# Patient Record
Sex: MALE | Race: WHITE | Employment: FULL TIME | URBAN - METROPOLITAN AREA
[De-identification: names, ages, dates, MRNs, and addresses within clinical notes are randomized per-mention and may not be internally consistent; named-entity substitution may affect disease eponyms.]

---

## 2018-01-23 ENCOUNTER — CLINICAL SUPPORT (OUTPATIENT)
Dept: PRIMARY CARE CLINIC | Age: 53
End: 2018-01-23

## 2018-01-23 DIAGNOSIS — Z23 ENCOUNTER FOR IMMUNIZATION: Primary | ICD-10-CM

## 2018-01-24 NOTE — PATIENT INSTRUCTIONS
Vaccine Information Statement    Influenza (Flu) Vaccine (Inactivated or Recombinant): What you need to know    Many Vaccine Information Statements are available in Hebrew and other languages. See www.immunize.org/vis  Hojas de Información Sobre Vacunas están disponibles en Español y en muchos otros idiomas. Visite www.immunize.org/vis    1. Why get vaccinated? Influenza (flu) is a contagious disease that spreads around the [place] every year, usually between October and May. Flu is caused by influenza viruses, and is spread mainly by coughing, sneezing, and close contact. Anyone can get flu. Flu strikes suddenly and can last several days. Symptoms vary by age, but can include:   fever/chills   sore throat   muscle aches   fatigue   cough   headache    runny or stuffy nose    Flu can also lead to pneumonia and blood infections, and cause diarrhea and seizures in children. If you have a medical condition, such as heart or lung disease, flu can make it worse. Flu is more dangerous for some people. Infants and young children, people 72years of age and older, pregnant women, and people with certain health conditions or a weakened immune system are at greatest risk. Each year thousands of people in the [facility] die from flu, and many more are hospitalized. Flu vaccine can:   keep you from getting flu,   make flu less severe if you do get it, and   keep you from spreading flu to your family and other people. 2. Inactivated and recombinant flu vaccines    A dose of flu vaccine is recommended every flu season. Children 6 months through 6years of age may need two doses during the same flu season. Everyone else needs only one dose each flu season.        Some inactivated flu vaccines contain a very small amount of a mercury-based preservative called thimerosal. Studies have not shown thimerosal in vaccines to be harmful, but flu vaccines that do not contain thimerosal are available. There is no live flu virus in flu shots. They cannot cause the flu. There are many flu viruses, and they are always changing. Each year a new flu vaccine is made to protect against three or four viruses that are likely to cause disease in the upcoming flu season. But even when the vaccine doesnt exactly match these viruses, it may still provide some protection    Flu vaccine cannot prevent:   flu that is caused by a virus not covered by the vaccine, or   illnesses that look like flu but are not. It takes about 2 weeks for protection to develop after vaccination, and protection lasts through the flu season. 3. Some people should not get this vaccine    Tell the person who is giving you the vaccine:     If you have any severe, life-threatening allergies. If you ever had a life-threatening allergic reaction after a dose of flu vaccine, or have a severe allergy to any part of this vaccine, you may be advised not to get vaccinated. Most, but not all, types of flu vaccine contain a small amount of egg protein.  If you ever had Guillain-Barré Syndrome (also called GBS). Some people with a history of GBS should not get this vaccine. This should be discussed with your doctor.  If you are not feeling well. It is usually okay to get flu vaccine when you have a mild illness, but you might be asked to come back when you feel better. 4. Risks of a vaccine reaction    With any medicine, including vaccines, there is a chance of reactions. These are usually mild and go away on their own, but serious reactions are also possible. Most people who get a flu shot do not have any problems with it.      Minor problems following a flu shot include:    soreness, redness, or swelling where the shot was given     hoarseness   sore, red or itchy eyes   cough   fever   aches   headache   itching   fatigue  If these problems occur, they usually begin soon after the shot and last 1 or 2 days. More serious problems following a flu shot can include the following:     There may be a small increased risk of Guillain-Barré Syndrome (GBS) after inactivated flu vaccine. This risk has been estimated at 1 or 2 additional cases per million people vaccinated. This is much lower than the risk of severe complications from flu, which can be prevented by flu vaccine.  Young children who get the flu shot along with pneumococcal vaccine (PCV13) and/or DTaP vaccine at the same time might be slightly more likely to have a seizure caused by fever. Ask your doctor for more information. Tell your doctor if a child who is getting flu vaccine has ever had a seizure. Problems that could happen after any injected vaccine:      People sometimes faint after a medical procedure, including vaccination. Sitting or lying down for about 15 minutes can help prevent fainting, and injuries caused by a fall. Tell your doctor if you feel dizzy, or have vision changes or ringing in the ears.  Some people get severe pain in the shoulder and have difficulty moving the arm where a shot was given. This happens very rarely.  Any medication can cause a severe allergic reaction. Such reactions from a vaccine are very rare, estimated at about 1 in a million doses, and would happen within a few minutes to a few hours after the vaccination. As with any medicine, there is a very remote chance of a vaccine causing a serious injury or death. The safety of vaccines is always being monitored. For more information, visit: www.cdc.gov/vaccinesafety/    5. What if there is a serious reaction? What should I look for?  Look for anything that concerns you, such as signs of a severe allergic reaction, very high fever, or unusual behavior.     Signs of a severe allergic reaction can include hives, swelling of the face and throat, difficulty breathing, a fast heartbeat, dizziness, and weakness - usually within a few minutes to a few hours after the vaccination. What should I do?  If you think it is a severe allergic reaction or other emergency that cant wait, call 9-1-1 and get the person to the nearest hospital. Otherwise, call your doctor.  Reactions should be reported to the Vaccine Adverse Event Reporting System (VAERS). Your doctor should file this report, or you can do it yourself through  the VAERS web site at www.vaers. [facility].gov, or by calling 1-832.833.4299. VAERS does not give medical advice. 6. The National Vaccine Injury Compensation Program    The [facility] Vaccine Injury Compensation Program (VICP) is a federal program that was created to compensate people who may have been injured by certain vaccines. Persons who believe they may have been injured by a vaccine can learn about the program and about filing a claim by calling 0-433.480.8202 or visiting the [employer] website at www.[facility].gov/vaccinecompensation. There is a time limit to file a claim for compensation. 7. How can I learn more?  Ask your healthcare provider. He or she can give you the vaccine package insert or suggest other sources of information.  Call your local or state health department.  Contact the Centers for Disease Control and Prevention (CDC):  - Call 9-310.557.4182 (1-800-CDC-INFO) or  - Visit CDCs website at www.cdc.gov/flu    Vaccine Information Statement   Inactivated Influenza Vaccine   8/7/2015  43 MEENU Ania Sloan [id number]    Department of Health and Human Services  Centers for Disease Control and Prevention    Office Use Only

## 2018-01-24 NOTE — PROGRESS NOTES
Chief Complaint   Patient presents with    Immunization/Injection     pt here to receive Influenza vaccine      Junior KG Hightower II  is a 47 y.o.  male  who present for Influenza immunizations/injections. He denies any symptoms , reactions or allergies that would exclude them from being immunized today. Injection given in left deltoid. Verbal order given for vaccine by Cathie Rosales NP. Risks and adverse reactions were discussed and the VIS was given if applicable to them. All questions were addressed. He was observed for 10 min post injection. There were no reactions observed.     Jesus Nelson LPN

## 2018-08-02 ENCOUNTER — HOSPITAL ENCOUNTER (EMERGENCY)
Age: 53
Discharge: HOME | End: 2018-08-02
Attending: EMERGENCY MEDICINE | Admitting: EMERGENCY MEDICINE
Payer: COMMERCIAL

## 2018-08-02 ENCOUNTER — APPOINTMENT (OUTPATIENT)
Dept: CT IMAGING | Age: 53
End: 2018-08-02
Attending: EMERGENCY MEDICINE
Payer: COMMERCIAL

## 2018-08-02 VITALS
OXYGEN SATURATION: 99 % | BODY MASS INDEX: 28.7 KG/M2 | TEMPERATURE: 98.2 F | DIASTOLIC BLOOD PRESSURE: 93 MMHG | HEIGHT: 69 IN | SYSTOLIC BLOOD PRESSURE: 144 MMHG | RESPIRATION RATE: 14 BRPM | WEIGHT: 193.78 LBS | HEART RATE: 77 BPM

## 2018-08-02 DIAGNOSIS — N20.0 KIDNEY STONE: Primary | ICD-10-CM

## 2018-08-02 LAB
ALBUMIN SERPL-MCNC: 4.2 G/DL (ref 3.5–5)
ALBUMIN/GLOB SERPL: 1.2 {RATIO} (ref 1.1–2.2)
ALP SERPL-CCNC: 73 U/L (ref 45–117)
ALT SERPL-CCNC: 23 U/L (ref 12–78)
ANION GAP SERPL CALC-SCNC: 10 MMOL/L (ref 5–15)
APPEARANCE UR: CLEAR
AST SERPL-CCNC: 12 U/L (ref 15–37)
BACTERIA URNS QL MICRO: NEGATIVE /HPF
BASOPHILS # BLD: 0.1 K/UL (ref 0–0.1)
BASOPHILS NFR BLD: 0 % (ref 0–1)
BILIRUB SERPL-MCNC: 0.4 MG/DL (ref 0.2–1)
BILIRUB UR QL: NEGATIVE
BUN SERPL-MCNC: 25 MG/DL (ref 6–20)
BUN/CREAT SERPL: 16 (ref 12–20)
CALCIUM SERPL-MCNC: 9 MG/DL (ref 8.5–10.1)
CHLORIDE SERPL-SCNC: 107 MMOL/L (ref 97–108)
CO2 SERPL-SCNC: 21 MMOL/L (ref 21–32)
COLOR UR: ABNORMAL
CREAT SERPL-MCNC: 1.52 MG/DL (ref 0.7–1.3)
DIFFERENTIAL METHOD BLD: ABNORMAL
EOSINOPHIL # BLD: 0 K/UL (ref 0–0.4)
EOSINOPHIL NFR BLD: 0 % (ref 0–7)
EPITH CASTS URNS QL MICRO: ABNORMAL /LPF
ERYTHROCYTE [DISTWIDTH] IN BLOOD BY AUTOMATED COUNT: 12.1 % (ref 11.5–14.5)
GLOBULIN SER CALC-MCNC: 3.5 G/DL (ref 2–4)
GLUCOSE SERPL-MCNC: 131 MG/DL (ref 65–100)
GLUCOSE UR STRIP.AUTO-MCNC: NEGATIVE MG/DL
HCT VFR BLD AUTO: 39.7 % (ref 36.6–50.3)
HGB BLD-MCNC: 13.8 G/DL (ref 12.1–17)
HGB UR QL STRIP: ABNORMAL
HYALINE CASTS URNS QL MICRO: ABNORMAL /LPF (ref 0–5)
IMM GRANULOCYTES # BLD: 0.1 K/UL (ref 0–0.04)
IMM GRANULOCYTES NFR BLD AUTO: 0 % (ref 0–0.5)
KETONES UR QL STRIP.AUTO: ABNORMAL MG/DL
LEUKOCYTE ESTERASE UR QL STRIP.AUTO: NEGATIVE
LIPASE SERPL-CCNC: 183 U/L (ref 73–393)
LYMPHOCYTES # BLD: 1 K/UL (ref 0.8–3.5)
LYMPHOCYTES NFR BLD: 7 % (ref 12–49)
MCH RBC QN AUTO: 30.3 PG (ref 26–34)
MCHC RBC AUTO-ENTMCNC: 34.8 G/DL (ref 30–36.5)
MCV RBC AUTO: 87.1 FL (ref 80–99)
MONOCYTES # BLD: 0.9 K/UL (ref 0–1)
MONOCYTES NFR BLD: 6 % (ref 5–13)
NEUTS SEG # BLD: 11.4 K/UL (ref 1.8–8)
NEUTS SEG NFR BLD: 85 % (ref 32–75)
NITRITE UR QL STRIP.AUTO: NEGATIVE
NRBC # BLD: 0 K/UL (ref 0–0.01)
NRBC BLD-RTO: 0 PER 100 WBC
PH UR STRIP: 5 [PH] (ref 5–8)
PLATELET # BLD AUTO: 213 K/UL (ref 150–400)
PMV BLD AUTO: 9.9 FL (ref 8.9–12.9)
POTASSIUM SERPL-SCNC: 3.8 MMOL/L (ref 3.5–5.1)
PROT SERPL-MCNC: 7.7 G/DL (ref 6.4–8.2)
PROT UR STRIP-MCNC: NEGATIVE MG/DL
RBC # BLD AUTO: 4.56 M/UL (ref 4.1–5.7)
RBC #/AREA URNS HPF: ABNORMAL /HPF (ref 0–5)
SODIUM SERPL-SCNC: 138 MMOL/L (ref 136–145)
SP GR UR REFRACTOMETRY: 1.03 (ref 1–1.03)
UA: UC IF INDICATED,UAUC: ABNORMAL
UROBILINOGEN UR QL STRIP.AUTO: 0.2 EU/DL (ref 0.2–1)
WBC # BLD AUTO: 13.3 K/UL (ref 4.1–11.1)
WBC URNS QL MICRO: ABNORMAL /HPF (ref 0–4)

## 2018-08-02 PROCEDURE — 96360 HYDRATION IV INFUSION INIT: CPT

## 2018-08-02 PROCEDURE — 81001 URINALYSIS AUTO W/SCOPE: CPT | Performed by: EMERGENCY MEDICINE

## 2018-08-02 PROCEDURE — 85025 COMPLETE CBC W/AUTO DIFF WBC: CPT | Performed by: EMERGENCY MEDICINE

## 2018-08-02 PROCEDURE — 74011636320 HC RX REV CODE- 636/320: Performed by: EMERGENCY MEDICINE

## 2018-08-02 PROCEDURE — 80053 COMPREHEN METABOLIC PANEL: CPT | Performed by: EMERGENCY MEDICINE

## 2018-08-02 PROCEDURE — 74177 CT ABD & PELVIS W/CONTRAST: CPT

## 2018-08-02 PROCEDURE — 83690 ASSAY OF LIPASE: CPT | Performed by: EMERGENCY MEDICINE

## 2018-08-02 PROCEDURE — 36415 COLL VENOUS BLD VENIPUNCTURE: CPT | Performed by: EMERGENCY MEDICINE

## 2018-08-02 PROCEDURE — 99283 EMERGENCY DEPT VISIT LOW MDM: CPT

## 2018-08-02 PROCEDURE — 74011250636 HC RX REV CODE- 250/636: Performed by: EMERGENCY MEDICINE

## 2018-08-02 RX ORDER — RANITIDINE 150 MG/1
150 TABLET, FILM COATED ORAL 2 TIMES DAILY
COMMUNITY
End: 2019-04-10 | Stop reason: ALTCHOICE

## 2018-08-02 RX ORDER — SODIUM CHLORIDE 9 MG/ML
1000 INJECTION, SOLUTION INTRAVENOUS ONCE
Status: COMPLETED | OUTPATIENT
Start: 2018-08-02 | End: 2018-08-02

## 2018-08-02 RX ORDER — TAMSULOSIN HYDROCHLORIDE 0.4 MG/1
0.4 CAPSULE ORAL DAILY
Qty: 14 CAP | Refills: 0 | Status: SHIPPED | OUTPATIENT
Start: 2018-08-02 | End: 2019-02-18 | Stop reason: ALTCHOICE

## 2018-08-02 RX ORDER — ONDANSETRON 8 MG/1
8 TABLET, ORALLY DISINTEGRATING ORAL
Qty: 12 TAB | Refills: 0 | Status: SHIPPED | OUTPATIENT
Start: 2018-08-02 | End: 2019-02-18 | Stop reason: ALTCHOICE

## 2018-08-02 RX ORDER — OXYCODONE HYDROCHLORIDE 5 MG/1
5 TABLET ORAL
Qty: 12 TAB | Refills: 0 | Status: SHIPPED | OUTPATIENT
Start: 2018-08-02 | End: 2019-02-18 | Stop reason: ALTCHOICE

## 2018-08-02 RX ORDER — OXYCODONE HYDROCHLORIDE 5 MG/1
5 TABLET ORAL
Status: DISCONTINUED | OUTPATIENT
Start: 2018-08-02 | End: 2018-08-02 | Stop reason: HOSPADM

## 2018-08-02 RX ORDER — SODIUM CHLORIDE 0.9 % (FLUSH) 0.9 %
10 SYRINGE (ML) INJECTION
Status: COMPLETED | OUTPATIENT
Start: 2018-08-02 | End: 2018-08-02

## 2018-08-02 RX ORDER — TAMSULOSIN HYDROCHLORIDE 0.4 MG/1
0.4 CAPSULE ORAL DAILY
Qty: 14 CAP | Refills: 0 | Status: SHIPPED | OUTPATIENT
Start: 2018-08-02 | End: 2018-08-02

## 2018-08-02 RX ORDER — KETOROLAC TROMETHAMINE 30 MG/ML
15 INJECTION, SOLUTION INTRAMUSCULAR; INTRAVENOUS
Status: DISCONTINUED | OUTPATIENT
Start: 2018-08-02 | End: 2018-08-02 | Stop reason: HOSPADM

## 2018-08-02 RX ORDER — ACETAMINOPHEN 500 MG
1000 TABLET ORAL
Status: DISCONTINUED | OUTPATIENT
Start: 2018-08-02 | End: 2018-08-02 | Stop reason: HOSPADM

## 2018-08-02 RX ORDER — SODIUM CHLORIDE 9 MG/ML
50 INJECTION, SOLUTION INTRAVENOUS
Status: COMPLETED | OUTPATIENT
Start: 2018-08-02 | End: 2018-08-02

## 2018-08-02 RX ADMIN — Medication 10 ML: at 02:47

## 2018-08-02 RX ADMIN — SODIUM CHLORIDE 1000 ML: 900 INJECTION, SOLUTION INTRAVENOUS at 03:09

## 2018-08-02 RX ADMIN — IOPAMIDOL 100 ML: 755 INJECTION, SOLUTION INTRAVENOUS at 02:47

## 2018-08-02 RX ADMIN — SODIUM CHLORIDE 50 ML/HR: 900 INJECTION, SOLUTION INTRAVENOUS at 02:47

## 2018-08-02 NOTE — ED TRIAGE NOTES
Assumed care of pt from triage. Pt is A&O x 4. Pt reports CC of Left flank pain that started today around 1900. Pt reports pain about 4-5 days ago that resolved. Pt also reports vomiting earlier tonight. Pt denies CP and SOB. Pt resting on stretcher in POC with family at bedside.

## 2018-08-02 NOTE — ED TRIAGE NOTES
Left flank pain about 4-5 days ago. Then started again tonight. Wrapping around to front. Denies blood in urine. \"Feel like I am constipated. \"  Small bowel movement tonight per patient report.

## 2018-08-02 NOTE — DISCHARGE INSTRUCTIONS
Kidney Stone: Care Instructions  Your Care Instructions    Kidney stones are formed when salts, minerals, and other substances normally found in the urine clump together. They can be as small as grains of sand or, rarely, as large as golf balls. While the stone is traveling through the ureter, which is the tube that carries urine from the kidney to the bladder, you will probably feel pain. The pain may be mild or very severe. You may also have some blood in your urine. As soon as the stone reaches the bladder, any intense pain should go away. If a stone is too large to pass on its own, you may need a medical procedure to help you pass the stone. The doctor has checked you carefully, but problems can develop later. If you notice any problems or new symptoms, get medical treatment right away. Follow-up care is a key part of your treatment and safety. Be sure to make and go to all appointments, and call your doctor if you are having problems. It's also a good idea to know your test results and keep a list of the medicines you take. How can you care for yourself at home? · Drink plenty of fluids, enough so that your urine is light yellow or clear like water. If you have kidney, heart, or liver disease and have to limit fluids, talk with your doctor before you increase the amount of fluids you drink. · Take pain medicines exactly as directed. Call your doctor if you think you are having a problem with your medicine. ¨ If the doctor gave you a prescription medicine for pain, take it as prescribed. ¨ If you are not taking a prescription pain medicine, ask your doctor if you can take an over-the-counter medicine. Read and follow all instructions on the label. · Your doctor may ask you to strain your urine so that you can collect your kidney stone when it passes. You can use a kitchen strainer or a tea strainer to catch the stone. Store it in a plastic bag until you see your doctor again.   Preventing future kidney stones  Some changes in your diet may help prevent kidney stones. Depending on the cause of your stones, your doctor may recommend that you:  · Drink plenty of fluids, enough so that your urine is light yellow or clear like water. If you have kidney, heart, or liver disease and have to limit fluids, talk with your doctor before you increase the amount of fluids you drink. · Limit coffee, tea, and alcohol. Also avoid grapefruit juice. · Do not take more than the recommended daily dose of vitamins C and D.  · Avoid antacids such as Gaviscon, Maalox, Mylanta, or Tums. · Limit the amount of salt (sodium) in your diet. · Eat a balanced diet that is not too high in protein. · Limit foods that are high in a substance called oxalate, which can cause kidney stones. These foods include dark green vegetables, rhubarb, chocolate, wheat bran, nuts, cranberries, and beans. When should you call for help? Call your doctor now or seek immediate medical care if:    · You cannot keep down fluids.     · Your pain gets worse.     · You have a fever or chills.     · You have new or worse pain in your back just below your rib cage (the flank area).     · You have new or more blood in your urine.    Watch closely for changes in your health, and be sure to contact your doctor if:    · You do not get better as expected. Where can you learn more? Go to http://asif-stephanie.info/. Enter H251 in the search box to learn more about \"Kidney Stone: Care Instructions. \"  Current as of: May 12, 2017  Content Version: 11.7  © 1702-5203 [employer]. Care instructions adapted under license by [employer] (which disclaims liability or warranty for this information). If you have questions about a medical condition or this instruction, always ask your healthcare professional. [address] any warranty or liability for your use of this information.

## 2018-08-02 NOTE — ED NOTES
Pt reports no pain at this time and does not want any pain medication. Bedside and Verbal shift change report given to Tu. Report included the following information SBAR, ED Summary and Recent Results.

## 2018-08-02 NOTE — ED PROVIDER NOTES
EMERGENCY DEPARTMENT HISTORY AND PHYSICAL EXAM 
     
 
Date: 8/2/2018 Patient Name: Kasie Rose History of Presenting Illness Chief Complaint Patient presents with  Flank Pain History Provided By: Patient HPI: Junior Jared Reich is a 47 y.o. male, pmhx DM, who presents ambulatory to the ED c/o sudden onset sharp constant L sided flank pain that began PTA this morning. Pt reports additional nausea and vomiting (x1). Pt states his pain is exacerbated when voiding. He denies any hx of kidney stones or follow up with urology. Pt reports having a similar episode a few nights ago, but states it resolved on its own without intervention. He otherwise specifically denies any recent fevers, chills, diarrhea, CP, SOB, urinary sxs, changes in BM, or headache. PCP: Thierry Meneses III,  
 
PMHx: Significant for DM PSHx: Significant for I&D Social Hx: -tobacco (former), +EtOH (occasionally), -Illicit Drugs There are no other complaints, changes, or physical findings at this time. Current Facility-Administered Medications Medication Dose Route Frequency Provider Last Rate Last Dose  ketorolac (TORADOL) injection 15 mg  15 mg IntraVENous NOW [employer], DO      
 acetaminophen (TYLENOL) tablet 1,000 mg  1,000 mg Oral NOW [employer], DO      
 oxyCODONE IR (ROXICODONE) tablet 5 mg  5 mg Oral NOW [employer], DO      
 
Current Outpatient Prescriptions Medication Sig Dispense Refill  raNITIdine (ZANTAC) 150 mg tablet Take 150 mg by mouth two (2) times a day.  oxyCODONE IR (ROXICODONE) 5 mg immediate release tablet Take 1 Tab by mouth every four (4) hours as needed for Pain. Max Daily Amount: 30 mg. 12 Tab 0  
 ondansetron (ZOFRAN ODT) 8 mg disintegrating tablet Take 1 Tab by mouth every eight (8) hours as needed for Nausea. 12 Tab 0  
 tamsulosin (FLOMAX) 0.4 mg capsule Take 1 Cap by mouth daily. 14 Cap 0  
 atorvastatin (LIPITOR) 20 mg tablet Take 1 Tab by mouth daily. 30 Tab 9  
 metFORMIN (GLUCOPHAGE) 500 mg tablet Take 1 Tab by mouth two (2) times daily (with meals). 60 Tab 2 Past History Past Medical History: 
Past Medical History:  
Diagnosis Date  Diabetes ([address] Past Surgical History: 
Past Surgical History:  
Procedure Laterality Date  HX CYST INCISION AND DRAINAGE Family History: 
Family History Problem Relation Age of Onset  Diabetes Mother  Hypertension Mother  Diabetes Father  Hypertension Father  Other Father   
  diverticulitis  Diabetes Other  Other Sister   
  diverticulitis  Diabetes Sister  Other Maternal Grandmother   
  scoliosis  Diabetes Maternal Grandmother  Heart Disease Maternal Grandfather  Heart Failure Maternal Grandfather  Arthritis-rheumatoid Maternal Grandfather  Heart Disease Paternal Grandmother  Cancer Paternal Grandfather  Heart Disease Paternal Grandfather Social History: 
Social History Substance Use Topics  Smoking status: Former Smoker  Smokeless tobacco: Never Used  Alcohol use 8.4 oz/week 14 Cans of beer per week Allergies: Allergies Allergen Reactions  Aspirin Nausea Only Review of Systems Review of Systems Constitutional: Negative for chills and fever. HENT: Negative for congestion and sore throat. Eyes: Negative for visual disturbance. Respiratory: Negative for cough and shortness of breath. Cardiovascular: Negative for chest pain and leg swelling. Gastrointestinal: Positive for nausea and vomiting. Negative for abdominal pain, blood in stool and diarrhea. Endocrine: Negative for polyuria. Genitourinary: Positive for flank pain. Negative for dysuria and testicular pain. Musculoskeletal: Negative for arthralgias, joint swelling and myalgias. Skin: Negative for rash. Allergic/Immunologic: Negative for immunocompromised state. Neurological: Negative for weakness and headaches. Hematological: Does not bruise/bleed easily. Psychiatric/Behavioral: Negative for confusion. Physical Exam  
Physical Exam  
Constitutional: He is oriented to person, place, and time. He appears well-developed and well-nourished. HENT:  
Head: Normocephalic and atraumatic. Mouth/Throat: Oropharynx is clear and moist.  
Eyes: Conjunctivae and EOM are normal. Pupils are equal, round, and reactive to light. Neck: Normal range of motion. Neck supple. No tracheal deviation present. Cardiovascular: Normal rate, regular rhythm, normal heart sounds and intact distal pulses. No murmur heard. Pulmonary/Chest: Effort normal and breath sounds normal. No respiratory distress. He has no wheezes. He has no rales. Abdominal: Soft. Bowel sounds are normal. He exhibits no distension and no mass. There is tenderness. There is no rebound and no guarding. Mild LLQ and L flank tenderness to palpation Musculoskeletal: He exhibits no edema or deformity. Neurological: He is alert and oriented to person, place, and time. GCS eye subscore is 4. GCS verbal subscore is 5. GCS motor subscore is 6. Skin: Skin is warm and dry. Psychiatric: He has a normal mood and affect. His speech is normal.  
Nursing note and vitals reviewed. Diagnostic Study Results Labs - Recent Results (from the past 12 hour(s)) CBC WITH AUTOMATED DIFF Collection Time: 08/02/18  1:44 AM  
Result Value Ref Range WBC 13.3 (H) 4.1 - 11.1 K/uL  
 RBC 4.56 4.10 - 5.70 M/uL  
 HGB 13.8 12.1 - 17.0 g/dL HCT 39.7 36.6 - 50.3 % MCV 87.1 80.0 - 99.0 FL  
 MCH 30.3 26.0 - 34.0 PG  
 MCHC 34.8 30.0 - 36.5 g/dL  
 RDW 12.1 11.5 - 14.5 % PLATELET 873 104 - 079 K/uL MPV 9.9 8.9 - 12.9 FL  
 NRBC 0.0 0  WBC ABSOLUTE NRBC 0.00 0.00 - 0.01 K/uL NEUTROPHILS 85 (H) 32 - 75 % LYMPHOCYTES 7 (L) 12 - 49 % MONOCYTES 6 5 - 13 % EOSINOPHILS 0 0 - 7 % BASOPHILS 0 0 - 1 %  IMMATURE GRANULOCYTES 0 0.0 - 0.5 % ABS. NEUTROPHILS 11.4 (H) 1.8 - 8.0 K/UL  
 ABS. LYMPHOCYTES 1.0 0.8 - 3.5 K/UL  
 ABS. MONOCYTES 0.9 0.0 - 1.0 K/UL  
 ABS. EOSINOPHILS 0.0 0.0 - 0.4 K/UL  
 ABS. BASOPHILS 0.1 0.0 - 0.1 K/UL  
 ABS. IMM. GRANS. 0.1 (H) 0.00 - 0.04 K/UL  
 DF AUTOMATED METABOLIC PANEL, COMPREHENSIVE Collection Time: 08/02/18  1:44 AM  
Result Value Ref Range Sodium 138 136 - 145 mmol/L Potassium 3.8 3.5 - 5.1 mmol/L Chloride 107 97 - 108 mmol/L  
 CO2 21 21 - 32 mmol/L Anion gap 10 5 - 15 mmol/L Glucose 131 (H) 65 - 100 mg/dL BUN 25 (H) 6 - 20 MG/DL Creatinine 1.52 (H) 0.70 - 1.30 MG/DL  
 BUN/Creatinine ratio 16 12 - 20 GFR est AA 59 (L) >60 ml/min/1.73m2 GFR est non-AA 49 (L) >60 ml/min/1.73m2 Calcium 9.0 8.5 - 10.1 MG/DL Bilirubin, total 0.4 0.2 - 1.0 MG/DL  
 ALT (SGPT) 23 12 - 78 U/L  
 AST (SGOT) 12 (L) 15 - 37 U/L Alk. phosphatase 73 45 - 117 U/L Protein, total 7.7 6.4 - 8.2 g/dL Albumin 4.2 3.5 - 5.0 g/dL Globulin 3.5 2.0 - 4.0 g/dL A-G Ratio 1.2 1.1 - 2.2 LIPASE Collection Time: 08/02/18  1:44 AM  
Result Value Ref Range Lipase 183 73 - 393 U/L  
URINALYSIS W/ REFLEX CULTURE Collection Time: 08/02/18  1:44 AM  
Result Value Ref Range Color YELLOW/STRAW Appearance CLEAR CLEAR Specific gravity 1.029 1.003 - 1.030    
 pH (UA) 5.0 5.0 - 8.0 Protein NEGATIVE  NEG mg/dL Glucose NEGATIVE  NEG mg/dL Ketone TRACE (A) NEG mg/dL Bilirubin NEGATIVE  NEG Blood LARGE (A) NEG Urobilinogen 0.2 0.2 - 1.0 EU/dL Nitrites NEGATIVE  NEG Leukocyte Esterase NEGATIVE  NEG    
 WBC 0-4 0 - 4 /hpf  
 RBC 20-50 0 - 5 /hpf Epithelial cells FEW FEW /lpf Bacteria NEGATIVE  NEG /hpf  
 UA:UC IF INDICATED CULTURE NOT INDICATED BY UA RESULT CNI Hyaline cast 0-2 0 - 5 /lpf Radiologic Studies -  
 
CT Results  (Last 48 hours) 08/02/18 0247  CT ABD PELV W CONT Final result Impression:  IMPRESSION:  
   
1. Left UVJ stone measuring 4 x 5 mm, resulting in acute left hydronephrosis and  
diffuse hydroureter. 2. Diffuse fatty infiltration of the liver. 3. Aortic atherosclerosis. No evidence of aortic aneurysm. Narrative:  EXAM:  CT ABD PELV W CONT INDICATION: Abdominal pain; flank pain, likely stone but no prior history assess  
for AAA  
   
COMPARISON: None CONTRAST:  100 mL of Isovue-370. TECHNIQUE:   
Following the uneventful intravenous administration of contrast, thin axial  
images were obtained through the abdomen and pelvis. Coronal and sagittal  
reconstructions were generated. Oral contrast was not administered. CT dose  
reduction was achieved through use of a standardized protocol tailored for this  
examination and automatic exposure control for dose modulation. FINDINGS:   
LUNG BASES: Clear. INCIDENTALLY IMAGED HEART AND MEDIASTINUM: Unremarkable. LIVER: There is diffuse fatty infiltration of the liver. GALLBLADDER: Unremarkable. SPLEEN: No mass. PANCREAS: No mass or ductal dilatation. ADRENALS: Unremarkable. KIDNEYS: There is a subcentimeter right renal cyst. The left kidney is acutely  
hydronephrotic, and there is also diffuse left hydroureter. STOMACH: Unremarkable. SMALL BOWEL: No dilatation or wall thickening. COLON: No dilatation or wall thickening. APPENDIX: Unremarkable. PERITONEUM: No ascites or pneumoperitoneum. RETROPERITONEUM: No lymphadenopathy or aortic aneurysm. There is aortic  
atherosclerosis. REPRODUCTIVE ORGANS: The prostate is noted. There is no pelvic free fluid. URINARY BLADDER: There is a left UVJ stone measuring 4 x 5 mm. There is also  
bladder wall thickening at the left UVJ. BONES: No destructive bone lesion. ADDITIONAL COMMENTS: N/A Medical Decision Making I am the first provider for this patient.  
 
I reviewed the vital signs, available nursing notes, past medical history, past surgical history, family history and social history. Vital Signs-Reviewed the patient's vital signs. Patient Vitals for the past 12 hrs: 
 Temp Pulse Resp BP SpO2  
08/02/18 0014 98.2 °F (36.8 °C) 77 14 (!) 144/93 99 % Records Reviewed: Nursing Notes and Old Medical Records Provider Notes (Medical Decision Making): MDM: Patient presents with flank pain. DDx: renal stone, pyelonephritis, muscular strain, testicular/ovarian pathology. Unlikely AAA given the story. Will get UA to evaluate for blood and infection and possibly imaging to evaluate for hydro. ED Course:  
Initial assessment performed. The patients presenting problems have been discussed, and they are in agreement with the care plan formulated and outlined with them. I have encouraged them to ask questions as they arise throughout their visit. Progress note: 
3:40 AM 
Pt noted to be feeling better, sxs significantly improved throughout ED stay, ready for discharge. Updated pt and/or family on all final lab and imaging findings. Will follow up as instructed. All questions have been answered, pt voiced understanding and agreement with plan. Narcotics were prescribed, pt was advised not to drive or operate heavy machinery. Specific return precautions provided as well as instructions to return to the ED should sx worsen at any time. Vital signs stable for discharge. Written by Darin Brown ED Scribe, as dictated by Judi Hernandez DO Diagnosis Clinical Impression: 1. Kidney stone PLAN: 
1. Discharge Medication List as of 8/2/2018  3:36 AM  
  
CONTINUE these medications which have NOT CHANGED Details  
raNITIdine (ZANTAC) 150 mg tablet Take 150 mg by mouth two (2) times a day., Historical Med  
  
atorvastatin (LIPITOR) 20 mg tablet Take 1 Tab by mouth daily. , Normal, Disp-30 Tab, R-9  
  
metFORMIN (GLUCOPHAGE) 500 mg tablet Take 1 Tab by mouth two (2) times daily (with meals). , Normal, Disp-60 Tab, R-2  
  
  
 
2. Follow-up Information Follow up With Details Comments Contact Info Professor [address], DO   [facility] [address] [facility] 
[phone] [facility] Schedule an appointment as soon as possible for a visit  [address] [facility], [address]   [phone] Return to ED if worse Disposition: 
 
DISCHARGE NOTE: 
3:50 AM 
The patient's results have been reviewed with family and/or caregiver. They verbally convey their understanding and agreement of the patient's signs, symptoms, diagnosis, treatment, and prognosis. They additionally agree to follow up as recommended in the discharge instructions or to return to the Emergency Room should the patient's condition change prior to their follow-up appointment. The family and/or caregiver verbally agrees with the care-plan and all of their questions have been answered. The discharge instructions have also been provided to the them along with educational information regarding the patient's diagnosis and a list of reasons why the patient would want to return to the ER prior to their follow-up appointment should their condition change. Written by SHAUN Fraire, as dictated by Dakota Moe DO. Attestations: This note is prepared by Alda Ayon, acting as DO Dakota Weber DO : The scribe's documentation has been prepared under my direction and personally reviewed by me in its entirety. I confirm that the note above accurately reflects all work, treatment, procedures, and medical decision making performed by me. This note will not be viewable in [address]

## 2018-08-02 NOTE — LETTER
[address] 
[facility] EMERGENCY DEPT 
[address] [place] [address]. [address] [id number] [phone] Work/School Note Date: 8/2/2018 To Whom It May concern: 
 
Junior KG Hightower II was seen and treated today in the emergency room by the following provider(s): 
Attending Provider: [employer] Data CorporationDO.   
 
Junior KG Hightower II may return to work on 08/04/18. Sincerely, Stevo Head RN

## 2018-08-02 NOTE — ED NOTES
Discharge instructions reviewed with patient. Discharge instructions given to patient per Dr. Shilpa Tinsley. Patient able to return/verbalize discharge instructions. Copy of discharge instructions given. Patient condition stable, respiratory status within normal limits, neuro status intact. Ambulatory out of ER, accompanied by wife.

## 2019-02-18 ENCOUNTER — OFFICE VISIT (OUTPATIENT)
Dept: PRIMARY CARE CLINIC | Age: 54
End: 2019-02-18

## 2019-02-18 VITALS
TEMPERATURE: 98.2 F | OXYGEN SATURATION: 96 % | HEIGHT: 69 IN | WEIGHT: 205.8 LBS | BODY MASS INDEX: 30.48 KG/M2 | DIASTOLIC BLOOD PRESSURE: 89 MMHG | RESPIRATION RATE: 16 BRPM | HEART RATE: 86 BPM | SYSTOLIC BLOOD PRESSURE: 133 MMHG

## 2019-02-18 DIAGNOSIS — K52.9 GASTROENTERITIS: Primary | ICD-10-CM

## 2019-02-18 NOTE — PATIENT INSTRUCTIONS
Gastroenteritis: Care Instructions  Your Care Instructions    Gastroenteritis is an illness that may cause nausea, vomiting, and diarrhea. It is sometimes called \"stomach flu. \" It can be caused by bacteria or a virus. You will probably begin to feel better in 1 to 2 days. In the meantime, get plenty of rest and make sure you do not become dehydrated. Dehydration occurs when your body loses too much fluid. Follow-up care is a key part of your treatment and safety. Be sure to make and go to all appointments, and call your doctor if you are having problems. It's also a good idea to know your test results and keep a list of the medicines you take. How can you care for yourself at home? · If your doctor prescribed antibiotics, take them as directed. Do not stop taking them just because you feel better. You need to take the full course of antibiotics. · Drink plenty of fluids to prevent dehydration, enough so that your urine is light yellow or clear like water. Choose water and other caffeine-free clear liquids until you feel better. If you have kidney, heart, or liver disease and have to limit fluids, talk with your doctor before you increase your fluid intake. · Drink fluids slowly, in frequent, small amounts, because drinking too much too fast can cause vomiting. · Begin eating mild foods, such as dry toast, yogurt, applesauce, bananas, and rice. Avoid spicy, hot, or high-fat foods, and do not drink alcohol or caffeine for a day or two. Do not drink milk or eat ice cream until you are feeling better. How to prevent gastroenteritis  · Keep hot foods hot and cold foods cold. · Do not eat meats, dressings, salads, or other foods that have been kept at room temperature for more than 2 hours. · Use a thermometer to check your refrigerator. It should be between 34°F and 40°F.  · Defrost meats in the refrigerator or microwave, not on the kitchen counter. · Keep your hands and your kitchen clean.  Wash your hands, cutting boards, and countertops with hot soapy water frequently. · Cook meat until it is well done. · Do not eat raw eggs or uncooked sauces made with raw eggs. · Do not take chances. If food looks or tastes spoiled, throw it out. When should you call for help? Call 911 anytime you think you may need emergency care. For example, call if:    · You vomit blood or what looks like coffee grounds.     · You passed out (lost consciousness).     · You pass maroon or very bloody stools.    Call your doctor now or seek immediate medical care if:    · You have severe belly pain.     · You have signs of needing more fluids. You have sunken eyes, a dry mouth, and pass only a little dark urine.     · You feel like you are going to faint.     · You have increased belly pain that does not go away in 1 to 2 days.     · You have new or increased nausea, or you are vomiting.     · You have a new or higher fever.     · Your stools are black and tarlike or have streaks of blood.    Watch closely for changes in your health, and be sure to contact your doctor if:    · You are dizzy or lightheaded.     · You urinate less than usual, or your urine is dark yellow or brown.     · You do not feel better with each day that goes by. Where can you learn more? Go to http://asif-stephanie.info/. Enter N142 in the search box to learn more about \"Gastroenteritis: Care Instructions. \"  Current as of: July 30, 2018  Content Version: 11.9  © 8321-2268 [employer]. Care instructions adapted under license by [employer] (which disclaims liability or warranty for this information). If you have questions about a medical condition or this instruction, always ask your healthcare professional. [place] [address] any warranty or liability for your use of this information.

## 2019-02-18 NOTE — PROGRESS NOTES
Chief Complaint   Patient presents with    Chest burn     Noted a lot of burping, bloating and stomach gurgling since yesterday, ate spicy food for breakfast, snacks

## 2019-02-19 NOTE — PROGRESS NOTES
Subjective:      Patient : This patient is a 47 y.o. male with chief complaint of Nausea, heartburn, indigestion and diarrhea x 1 day. The symptoms began >24 hours ago and have improved. The symptoms were rapid  in onset. The patient states the stools have been very watery. The stools are brown. His symptoms began after he had a day of eating \"All the wrong things\" He states that he binged on fried, greasy and spicy foods x 1 day and then started to feel bad. He has not taken any medications for GI Upset, however he stopped eating and started to feel better. The patient has not complaint of abdominal pain. The patient has not tried OTCs for relief of nausea or diarrhea at home for his symptoms without relief. He rates his symtoms as moderate. Objective:     ROS:    Feeling well. No dyspnea or chest pain on exertion. No abdominal pain,  black or bloody stools. No urinary tract or prostatic symptoms. No neurological complaints. OBJECTIVE:   The patient appears well, alert, oriented x 3, in no distress. Visit Vitals  /89   Pulse 86   Temp 98.2 °F (36.8 °C) (Oral)   Resp 16   Ht 5' 9\" (1.753 m)   Wt 205 lb 12.8 oz (93.4 kg)   SpO2 96%   BMI 30.39 kg/m²     ENT normal.  Neck supple. No adenopathy or thyromegaly. HATTIE. Lungs are clear, good air entry, no wheezes, rhonchi or rales. Cardiovascular:S1 and S2 normal, no murmurs, regular rate and rhythm. Abdomen is soft without tenderness, guarding, mass or organomegaly. Extremities show no edema, normal peripheral pulses. Neurological is normal without focal findings. Assessment/Plan:   Resolving Gastroenteritis brought on by diet      ICD-10-CM ICD-9-CM    1. Gastroenteritis K52.9 558.9    .discussed OTC medications to help with symptoms as well as diet to avoid further distress.

## 2019-04-10 ENCOUNTER — OFFICE VISIT (OUTPATIENT)
Dept: PRIMARY CARE CLINIC | Age: 54
End: 2019-04-10

## 2019-04-10 VITALS
WEIGHT: 205.8 LBS | HEIGHT: 69 IN | OXYGEN SATURATION: 98 % | DIASTOLIC BLOOD PRESSURE: 93 MMHG | HEART RATE: 92 BPM | BODY MASS INDEX: 30.48 KG/M2 | SYSTOLIC BLOOD PRESSURE: 145 MMHG | TEMPERATURE: 98.2 F | RESPIRATION RATE: 18 BRPM

## 2019-04-10 DIAGNOSIS — K21.9 GASTROESOPHAGEAL REFLUX DISEASE, ESOPHAGITIS PRESENCE NOT SPECIFIED: Primary | ICD-10-CM

## 2019-04-10 DIAGNOSIS — E11.9 TYPE 2 DIABETES MELLITUS WITHOUT COMPLICATION, WITHOUT LONG-TERM CURRENT USE OF INSULIN (HCC): Chronic | ICD-10-CM

## 2019-04-10 RX ORDER — OMEPRAZOLE 40 MG/1
40 CAPSULE, DELAYED RELEASE ORAL DAILY
Qty: 30 CAP | Refills: 0 | Status: SHIPPED | OUTPATIENT
Start: 2019-04-10 | End: 2019-05-10

## 2019-04-10 NOTE — PROGRESS NOTES
HISTORY OF PRESENT ILLNESS Junior Danae Keller is a 47 y.o. male. 47year old  presents with episodic epigastric pain associated with chronic indigestion and mild nausea. Has had improvement with OTC ranitidine but symptoms have flared a few times as he put on ten pounds of weight over the winter. His diabetes is diet controlled supposedly. Claims his blood sugars are  on home checks consistently. Overdue for follow up with Dr. Rose Saleh. Epigastric Pain The history is provided by the patient. This is a recurrent problem. The current episode started 12 to 24 hours ago. The problem occurs every several days. The problem has been gradually improving. The pain is associated with stress, diet changes, eating and lifting. The pain is moderate. Associated symptoms include belching, diarrhea and nausea. Pertinent negatives include no fever, no vomiting, no constipation and no chest pain. The pain is relieved by one or more OTC medications and antacids. Past workup comments: none. His past medical history is significant for DM. His past medical history does not include PUD or gallstones. Review of Systems Constitutional: Negative for fever and weight loss. Respiratory: Negative for cough. Cardiovascular: Negative for chest pain. Gastrointestinal: Positive for abdominal pain, diarrhea, heartburn and nausea. Negative for constipation and vomiting. Physical Exam  
Constitutional: He is oriented to person, place, and time. He appears well-developed and well-nourished. No distress. Eyes: No scleral icterus. Neck: Normal range of motion. Neck supple. No JVD present. Cardiovascular: Normal rate, regular rhythm and normal heart sounds. Exam reveals no gallop. No murmur heard. Pulmonary/Chest: Effort normal and breath sounds normal. He has no rales. He exhibits no tenderness. Abdominal: Soft.  Bowel sounds are normal. He exhibits no distension and no mass. There is tenderness (mild epigastric tenderness). There is no rebound and no guarding. Lymphadenopathy:  
  He has no cervical adenopathy. Neurological: He is alert and oriented to person, place, and time. ASSESSMENT and PLAN 
  ICD-10-CM ICD-9-CM 1. Gastroesophageal reflux disease, esophagitis presence not specified K21.9 530.81 omeprazole (PRILOSEC) 40 mg capsule 2. Type 2 diabetes mellitus without complication, without long-term current use of insulin (formerly [facility]) E11.9 250.00   
3. BMI 30.0-30.9,adult Z68.30 V85.30 Switch to Prilosec and follow antireflux regimen. Recommended return to Dr. Tere Marroquin for GI symptoms and DM2. Will need abdominal ultrasound if further symptoms on Prilosec.

## 2019-04-10 NOTE — PROGRESS NOTES
Chief Complaint Patient presents with  Epigastric Pain  
pt c/o intermittent epigastric pain and upset stomach, pt states he has been taking otc zantac to help with discomfort, pt concerned he may have gallstones, denies any nausea, vomiting, fever, constipation or diarrhea. This note will not be viewable in [address]

## 2019-04-10 NOTE — PATIENT INSTRUCTIONS
See Dr. Leandro Reyna for follow up on this stomach pain and for the diabetes in the next month. You may need an ultrasound of the abdomen to look for gallstones if symptoms persist. 
 
Avoid caffeine and foods that aggravate the symptoms. Try to lose the 10 pounds gained over the winter. Use omeprazole (Prilosec)  instead of Zantac. Gastroesophageal Reflux Disease (GERD): Care Instructions Your Care Instructions Gastroesophageal reflux disease (GERD) is the backward flow of stomach acid into the esophagus. The esophagus is the tube that leads from your throat to your stomach. A one-way valve prevents the stomach acid from moving up into this tube. When you have GERD, this valve does not close tightly enough. If you have mild GERD symptoms including heartburn, you may be able to control the problem with antacids or over-the-counter medicine. Changing your diet, losing weight, and making other lifestyle changes can also help reduce symptoms. Follow-up care is a key part of your treatment and safety. Be sure to make and go to all appointments, and call your doctor if you are having problems. It's also a good idea to know your test results and keep a list of the medicines you take. How can you care for yourself at home? · Take your medicines exactly as prescribed. Call your doctor if you think you are having a problem with your medicine. · Your doctor may recommend over-the-counter medicine. For mild or occasional indigestion, antacids, such as Tums, Gaviscon, Mylanta, or Maalox, may help. Your doctor also may recommend over-the-counter acid reducers, such as Pepcid AC, Tagamet HB, Zantac 75, or Prilosec. Read and follow all instructions on the label. If you use these medicines often, talk with your doctor. · Change your eating habits. ? It's best to eat several small meals instead of two or three large meals. ? After you eat, wait 2 to 3 hours before you lie down. ? Chocolate, mint, and alcohol can make GERD worse. ? Spicy foods, foods that have a lot of acid (like tomatoes and oranges), and coffee can make GERD symptoms worse in some people. If your symptoms are worse after you eat a certain food, you may want to stop eating that food to see if your symptoms get better. · Do not smoke or chew tobacco. Smoking can make GERD worse. If you need help quitting, talk to your doctor about stop-smoking programs and medicines. These can increase your chances of quitting for good. · If you have GERD symptoms at night, raise the head of your bed 6 to 8 inches by putting the frame on blocks or placing a foam wedge under the head of your mattress. (Adding extra pillows does not work.) · Do not wear tight clothing around your middle. · Lose weight if you need to. Losing just 5 to 10 pounds can help. When should you call for help? Call your doctor now or seek immediate medical care if: 
  · You have new or different belly pain.  
  · Your stools are black and tarlike or have streaks of blood.  
 Watch closely for changes in your health, and be sure to contact your doctor if: 
  · Your symptoms have not improved after 2 days.  
  · Food seems to catch in your throat or chest.  
Where can you learn more? Go to http://asif-stephanie.info/. Enter P039 in the search box to learn more about \"Gastroesophageal Reflux Disease (GERD): Care Instructions. \" Current as of: March 27, 2018 Content Version: 11.9 © 5198-9202 [employer]. Care instructions adapted under license by [facility] (which disclaims liability or warranty for this information). If you have questions about a medical condition or this instruction, always ask your healthcare professional. [place] [address] any warranty or liability for your use of this information.